# Patient Record
Sex: MALE | URBAN - METROPOLITAN AREA
[De-identification: names, ages, dates, MRNs, and addresses within clinical notes are randomized per-mention and may not be internally consistent; named-entity substitution may affect disease eponyms.]

---

## 2022-03-16 ENCOUNTER — OFFICE VISIT (OUTPATIENT)
Dept: ORTHOPEDIC SURGERY | Age: 42
End: 2022-03-16
Payer: OTHER GOVERNMENT

## 2022-03-16 VITALS
BODY MASS INDEX: 33.05 KG/M2 | WEIGHT: 244 LBS | HEIGHT: 72 IN | OXYGEN SATURATION: 97 % | HEART RATE: 89 BPM | RESPIRATION RATE: 15 BRPM

## 2022-03-16 DIAGNOSIS — M72.9: Primary | ICD-10-CM

## 2022-03-16 PROCEDURE — 99204 OFFICE O/P NEW MOD 45 MIN: CPT | Performed by: FAMILY MEDICINE

## 2022-03-16 RX ORDER — MINERAL OIL
ENEMA (ML) RECTAL
COMMUNITY

## 2022-03-16 RX ORDER — HYDROXYZINE 50 MG/1
50 TABLET, FILM COATED ORAL
COMMUNITY

## 2022-03-16 NOTE — LETTER
3/16/2022    Patient: Dustin Reed   YOB: 1980   Date of Visit: 3/16/2022     PRATIK Wilhelm  [address] [address]  Via Fax: [phone]    Dear PRATIK Wilhelm,      Thank you for referring Mr. Dustin Reed to [facility]. for evaluation. My notes for this consultation are attached. If you have questions, please do not hesitate to call me. I look forward to following your patient along with you.       Sincerely,    Julia Mcneill, DO

## 2022-03-16 NOTE — PROGRESS NOTES
HISTORY OF PRESENT ILLNESS    Fernando Wayne 1980 is a 39y.o. year old male comes in today as new patient for: left thigh mass    Patients symptoms have been present for 6-7 months. Pain level 8/10 mid-anterior thigh left. It has worsened with being pressed. No issues with walking. Patient has tried:  MRI at [facility] 2/10/2022 an dplaced referral for ortho eval.  No known injury. It is described as pain and mass in thigh w/o known injury. IMAGING: MRI left thigh 2/10/2022 images reviewed and I concur with:  IMPRESSION:  Small round heterogeneous hyperintense intramuscular mass on fluid-sensitive sequences with internal and peripheral low signal on all pulse sequences in the distal vastus intermedius muscle measuring 1.4 cm in maximal diameter, likely deep fibromatosis. Follow-up MRI in 6-12 months is suggested given the patient's pain and to ensure stability. Otherwise, unremarkable left thigh MRI. Past Surgical History:   Procedure Laterality Date    HX APPENDECTOMY      HX VASECTOMY       Social History     Socioeconomic History    Marital status: UNKNOWN   Tobacco Use    Smoking status: Former Smoker     Quit date:      Years since quittin.2    Smokeless tobacco: Never Used   Vaping Use    Vaping Use: Never used   Substance and Sexual Activity    Alcohol use: Yes     Comment: rarely    Drug use: Never      Current Outpatient Medications   Medication Sig Dispense Refill    fexofenadine (ALLEGRA) 180 mg tablet Take  by mouth.  hydrOXYzine HCL (ATARAX) 50 mg tablet Take 50 mg by mouth three (3) times daily as needed. History reviewed. No pertinent past medical history. History reviewed. No pertinent family history. ROS:  No unexplained weight loss, night sweats, night pain      Objective:  Pulse 89   Resp 15   Ht 6' (1.829 m)   Wt 244 lb (110.7 kg)   SpO2 97%   BMI 33.09 kg/m²   NEURO:  Sensation intact light touch B/L lower extremities.  Reflexes +2/4 patellar and Achilles bilaterally. MS:  LE Strength +5/5 bilateral .   Right thigh: approx 1cm firm mass middle anterior left quad. No Effusion . No skin changes. Assessment/Plan:     ICD-10-CM ICD-9-CM    1. Deep fibromatosis  M72.9 728.79        Patient (or guardian if minor) verbalizes understanding of evaluation and plan. Will stretch quad often and avoid pressure and RTC 5 or so months and will plan order MRI left thigh with contrast to determine stability. VV okay if needed. Total time spent on encounter including chart/imaging/lab review and evaluation/documentation/demo home program/coordination of care/form completion but not including time for any procedures/manipulation 47 minutes.